# Patient Record
Sex: MALE
[De-identification: names, ages, dates, MRNs, and addresses within clinical notes are randomized per-mention and may not be internally consistent; named-entity substitution may affect disease eponyms.]

---

## 2019-09-10 ENCOUNTER — APPOINTMENT (OUTPATIENT)
Dept: OTOLARYNGOLOGY | Facility: CLINIC | Age: 59
End: 2019-09-10
Payer: COMMERCIAL

## 2019-09-10 VITALS
SYSTOLIC BLOOD PRESSURE: 107 MMHG | WEIGHT: 140 LBS | DIASTOLIC BLOOD PRESSURE: 68 MMHG | BODY MASS INDEX: 23.32 KG/M2 | HEIGHT: 65 IN | HEART RATE: 55 BPM

## 2019-09-10 PROBLEM — Z00.00 ENCOUNTER FOR PREVENTIVE HEALTH EXAMINATION: Status: ACTIVE | Noted: 2019-09-10

## 2019-09-10 PROCEDURE — 99203 OFFICE O/P NEW LOW 30 MIN: CPT | Mod: 25

## 2019-09-10 PROCEDURE — 92504 EAR MICROSCOPY EXAMINATION: CPT

## 2019-09-10 RX ORDER — MUPIROCIN 20 MG/G
2 OINTMENT TOPICAL 3 TIMES DAILY
Qty: 1 | Refills: 0 | Status: ACTIVE | COMMUNITY
Start: 2019-09-10 | End: 1900-01-01

## 2019-09-10 RX ORDER — CETIRIZINE HCL 10 MG
TABLET ORAL
Refills: 0 | Status: ACTIVE | COMMUNITY

## 2019-09-10 RX ORDER — MELATONIN 3 MG
TABLET ORAL
Refills: 0 | Status: ACTIVE | COMMUNITY

## 2019-09-10 NOTE — ASSESSMENT
[FreeTextEntry1] : He has a history of recurrent inflammation of the nasal tip and nasal vestibule. This may be related to trimming of the nasal hairs. They were cut short on exam. I did not see a suspicious mass or lesion. There was a small fissure and crusting in the anterior superior right nasal vestibule at the junction of the columella and nasal tip. There was no cellulitis or abscess\par \par Plan\par -Findings and management options were discussed with the patient.\par -He was asked to avoid trimming or cutting the nasal hairs\par -Avoid nasal manipulation\par -He may clean the area with dilute peroxide solution and apply Bactroban ointment 2-3 times a day for one to 2 weeks. If he has worsening symptoms, we will discuss adding antibiotics. However he did not have significant inflammation on exam today\par -I would like to see him back in 2 weeks to check the area.I would like to make sure that there was no lesion underneath the crust.\par -call and return earlier if any concerns

## 2019-09-10 NOTE — HISTORY OF PRESENT ILLNESS
[de-identified] : IFRAH HODGSON is a 59 year patient Has a history of recurrent inflammation of the nasal tip just inside the nostrils. He's had this off and on for many years. He had discomfort and crusting recently on the right side. He tried Neosporin ointment for 4 days. It is better over the past day or so. He does have a history of trimming the nasal hairs. He does suffer from seasonal allergies. He was seen in the past for this condition

## 2019-09-10 NOTE — CONSULT LETTER
[Dear  ___] : Dear  [unfilled], [Consult Letter:] : I had the pleasure of evaluating your patient, [unfilled]. [Please see my note below.] : Please see my note below. [Consult Closing:] : Thank you very much for allowing me to participate in the care of this patient.  If you have any questions, please do not hesitate to contact me. [Sincerely,] : Sincerely, [FreeTextEntry3] : Rajani Saavedra MD\par

## 2019-10-10 ENCOUNTER — APPOINTMENT (OUTPATIENT)
Dept: OTOLARYNGOLOGY | Facility: CLINIC | Age: 59
End: 2019-10-10

## 2022-03-03 ENCOUNTER — APPOINTMENT (OUTPATIENT)
Dept: OTOLARYNGOLOGY | Facility: CLINIC | Age: 62
End: 2022-03-03
Payer: COMMERCIAL

## 2022-03-03 DIAGNOSIS — J34.0 ABSCESS, FURUNCLE AND CARBUNCLE OF NOSE: ICD-10-CM

## 2022-03-03 DIAGNOSIS — J31.0 CHRONIC RHINITIS: ICD-10-CM

## 2022-03-03 PROCEDURE — 99214 OFFICE O/P EST MOD 30 MIN: CPT | Mod: 25

## 2022-03-03 PROCEDURE — 92504 EAR MICROSCOPY EXAMINATION: CPT

## 2022-03-03 RX ORDER — AMOXICILLIN AND CLAVULANATE POTASSIUM 875; 125 MG/1; MG/1
875-125 TABLET, COATED ORAL
Qty: 14 | Refills: 0 | Status: ACTIVE | COMMUNITY
Start: 2022-03-03 | End: 1900-01-01

## 2022-03-03 RX ORDER — MUPIROCIN 20 MG/G
2 OINTMENT TOPICAL 3 TIMES DAILY
Qty: 1 | Refills: 0 | Status: ACTIVE | COMMUNITY
Start: 2022-03-03 | End: 1900-01-01

## 2022-03-03 NOTE — CONSULT LETTER
[Dear  ___] : Dear  [unfilled], [Courtesy Letter:] : I had the pleasure of seeing your patient, [unfilled], in my office today. [Please see my note below.] : Please see my note below. [Consult Closing:] : Thank you very much for allowing me to participate in the care of this patient.  If you have any questions, please do not hesitate to contact me. [Sincerely,] : Sincerely, [FreeTextEntry3] : Rajani Saavedra MD\par

## 2022-03-03 NOTE — ASSESSMENT
[FreeTextEntry1] : He has recurrent inflammation of the nasal tip and nasal vestibule.  A culture was taken of the area.  He continues to trim the nasal hairs.\par \par Plan\par -Findings and management options were discussed with the patient.\par -He was again asked to avoid trimming or cutting the nasal hairs\par -Avoid nasal manipulation\par -Bactroban ointment 2-3 times a day for one to 2 weeks. \par -I am also giving him a course of Augmentin.\par -He was asked to call for the culture results\par -Follow-up in 1 to 2 weeks to reevaluate the area\par -call and return earlier if any concerns

## 2022-03-03 NOTE — HISTORY OF PRESENT ILLNESS
[de-identified] : IFRAH HODGSON is a 61 year patient With a history of chronic rhinitis and recurrent nasal cellulitis.  This has been occurring intermittently for many years.  He has had crusting and discomfort on both sides.  He said there is also mild erythema.  He tried different over-the-counter antibiotic ointments including mupirocin.  It helps temporarily.  He does continue to trim his nasal hairs.  He may also have an early sinusitis

## 2022-03-07 LAB — BACTERIA NOSE AEROBE CULT: NORMAL
